# Patient Record
Sex: MALE | Race: WHITE
[De-identification: names, ages, dates, MRNs, and addresses within clinical notes are randomized per-mention and may not be internally consistent; named-entity substitution may affect disease eponyms.]

---
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
## 2018-08-09 ENCOUNTER — HOSPITAL ENCOUNTER (OUTPATIENT)
Dept: HOSPITAL 7 - FB.SDS | Age: 28
Discharge: HOME | End: 2018-08-09
Attending: SURGERY
Payer: MEDICAID

## 2018-08-09 VITALS — DIASTOLIC BLOOD PRESSURE: 82 MMHG | SYSTOLIC BLOOD PRESSURE: 143 MMHG

## 2018-08-09 DIAGNOSIS — Z88.0: ICD-10-CM

## 2018-08-09 DIAGNOSIS — Z79.899: ICD-10-CM

## 2018-08-09 DIAGNOSIS — K29.50: Primary | ICD-10-CM

## 2018-08-09 DIAGNOSIS — K21.0: ICD-10-CM

## 2018-08-09 DIAGNOSIS — F17.210: ICD-10-CM

## 2018-08-09 PROCEDURE — 43239 EGD BIOPSY SINGLE/MULTIPLE: CPT

## 2018-08-09 NOTE — OR
DATE OF OPERATION:  08/09/2018

 

SURGEON:  Stalin Kaiser MD

 

PROCEDURES PERFORMED:  EGD with cold forceps biopsy.

 

PREOPERATIVE DIAGNOSES:

1. Dysphagia.

2. Gastroesophageal reflux disease.

 

POSTOPERATIVE DIAGNOSES:

1. Gastritis.

2. Esophagitis.

 

INDICATIONS FOR PROCEDURE:  This is a 28-year-old white male who is referred

with a history of regurgitation, difficulty swallowing, and reflux.  He was

offered and accepted an EGD.

 

DESCRIPTION OF PROCEDURE:  After an excellent IV sedation was administered, the

bite block was inserted.  The flexible endoscope was passed without difficulty

down the patient's esophagus and into the stomach.  The stomach was insufflated,

and the scope was advanced through the pylorus, to the second portion of the

duodenum, and slowly withdrawn.  The following findings were noted.  Duodenum

unremarkable.  Stomach demonstrated some linear erosions.  Biopsies were taken.

GE junction measured approximately 40 cm.  Distal esophagus demonstrated some

erythema in the distal 2 to 3 cm, which was biopsied.  The remainder of the

esophageal exam was unremarkable.  Stomach was deflated.  The scope was removed.

The patient tolerated the procedure well and was taken to recovery room in a

good condition.

 

Job#: [id number]

DD: 08/09/2018 1019

DT: 08/09/2018 1127 AS/MODL

## 2018-08-09 NOTE — PCM.OPNOTE
- General Post-Op/Procedure Note


Date of Surgery/Procedure: 08/09/18


Operative Procedure(s): egd with bx


Findings: 





gastritis 


esophagitis 


Pre Op Diagnosis: dysphagia.  gerd


Post-Op Diagnosis: gastritis.  esophagitis


Anesthesia Technique: MAC


Primary Surgeon: Stalin Kaiser


Anesthesia Provider: Beatriz Fraser


Pathology: 





stomach and esophagus 


Complications: None


Condition: Good


Free Text/Narrative:: 





see dictation

## 2021-11-07 NOTE — EDM.PDOC
ED HPI GENERAL MEDICAL PROBLEM





- General


Stated Complaint: BACK PAIN


Time Seen by Provider: 11/07/21 09:10


Source of Information: Reports: Patient


History Limitations: Reports: No Limitations





- History of Present Illness


INITIAL COMMENTS - FREE TEXT/NARRATIVE: 





Patient presented to the ED because of low back pain which started 3 days ago. 

He was trying to move a 300 lb object at work and since then he c/o low back 

pain,3/10 -10/10 when it's worse. The pain is worse with movements, he took 

flexeril 10 mg without any relief.


  ** Lower Back


Pain Score (Numeric/FACES): 4





- Related Data


                                    Allergies











Allergy/AdvReac Type Severity Reaction Status Date / Time


 


Penicillins Allergy Unknown Hives Verified 02/24/16 22:47


 


acetaminophen Allergy  Hives Verified 08/09/16 13:30











Home Meds: 


                                    Home Meds





Esomeprazole [NexIUM] 40 mg PO DAILY 08/08/18 [History]


Lisinopril 20 mg PO DAILY 08/08/18 [History]


Propranolol [Inderal] 20 mg PO BID 08/08/18 [History]


Sertraline [Zoloft] 150 mg PO DAILY 08/08/18 [History]


Cyclobenzaprine [Flexeril] 10 mg PO TID PRN #15 tab 11/07/21 [Rx]


Ibuprofen 800 mg PO TID PRN #30 tablet 11/07/21 [Rx]


traMADol [Ultram] 100 mg PO Q8H PRN #15 tab 11/07/21 [Rx]











Past Medical History





- Past Health History


Medical/Surgical History: Denies Medical/Surgical History


HEENT History: Reports: Impaired Vision


Cardiovascular History: Reports: Hypertension


Respiratory History: Reports: None


Gastrointestinal History: Reports: GERD, Hiatal Hernia


Genitourinary History: Reports: None


OB/GYN History: Reports: None


Musculoskeletal History: Reports: Fracture


Other Musculoskeletal History: FX LEFT POINTER FINGER


Neurological History: Reports: None


Psychiatric History: Reports: ADHD, Addiction, Depression


Other Psychiatric History: HX DRUG ABUSE.  STATES TAKING POT SINCE AGE 13 ET 

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


Endocrine/Metabolic History: Reports: Obesity/BMI 30+


Hematologic History: Reports: None


Oncologic (Cancer) History: Reports: None


Dermatologic History: Reports: None





- Infectious Disease History


Infectious Disease History: Reports: None





- Past Surgical History


GI Surgical History: Reports: Appendectomy, Colonoscopy, EGD





Social & Family History





- Family History


Family Medical History: No Pertinent Family History





- Caffeine Use


Caffeine Use: Reports: Soda





ED ROS GENERAL





- Review of Systems


Review Of Systems: See Below


Constitutional: Reports: No Symptoms


HEENT: Reports: No Symptoms


Respiratory: Reports: No Symptoms


Cardiovascular: Reports: Dyspnea on Exertion


Endocrine: Reports: No Symptoms


GI/Abdominal: Reports: No Symptoms


: Reports: No Symptoms


Musculoskeletal: Reports: Back Pain, Muscle Pain, Muscle Stiffness


Skin: Reports: No Symptoms


Neurological: Reports: No Symptoms


Psychiatric: Reports: No Symptoms





ED EXAM,LOWER BACK PAIN/INJURY





- Physical Exam


Exam: See Below


Exam Limited By: No Limitations


General Appearance: Alert, No Apparent Distress


Ears: Normal External Exam, Normal Canal


Nose: Normal Inspection, Normal Mucosa, No Blood


Throat/Mouth: Normal Inspection, Normal Lips, Normal Teeth, Normal Gums, Normal 

Oropharynx, Normal Voice


Head: Atraumatic, Normocephalic


Neck: Normal Inspection, Supple, Non-Tender, Full Range of Motion


Respiratory/Chest: No Respiratory Distress, Lungs Clear, Normal Breath Sounds, 

No Accessory Muscle Use, Chest Non-Tender


Cardiovascular: Normal Peripheral Pulses, Regular Rate, Rhythm, No Edema, No 

Gallop, No JVD, No Murmur, No Rub


Back Exam: Normal Inspection, Full Range of Motion





Course





- Vital Signs


Text/Narrative:: 





Toradol 60 mg IM x1


Tramadol 100 mg PO x1


Flexeril 10 mg PO x1


Last Recorded V/S: 


                                Last Vital Signs











Temp  36.3 C   11/07/21 09:27


 


Pulse  98   11/07/21 09:27


 


Resp  18   11/07/21 09:27


 


BP  136/95 H  11/07/21 09:27


 


Pulse Ox  99   11/07/21 09:27














- Orders/Labs/Meds


Meds: 


Medications














Discontinued Medications














Generic Name Dose Route Start Last Admin





  Trade Name Kamarq  PRN Reason Stop Dose Admin


 


Cyclobenzaprine HCl  10 mg  11/07/21 09:07  11/07/21 09:21





  Cyclobenzaprine 10 Mg Tab  PO  11/07/21 09:08  10 mg





  NOW STA   Administration


 


Ketorolac Tromethamine  60 mg  11/07/21 09:07  11/07/21 09:20





  Ketorolac 30 Mg/Ml Sdv  IM  11/07/21 09:08  60 mg





  NOW STA   Administration


 


Tramadol HCl  100 mg  11/07/21 09:07  11/07/21 09:21





  Tramadol 50 Mg Tab  PO  11/07/21 09:08  100 mg





  NOW STA   Administration














Departure





- Departure


Time of Disposition: 09:45


Disposition: Home, Self-Care 01


Condition: Good


Clinical Impression: 


 Low back pain, Lumbosacral strain








- Discharge Information


Prescriptions: 


Cyclobenzaprine [Flexeril] 10 mg PO TID PRN #15 tab


 PRN Reason: Spasms


Ibuprofen 800 mg PO TID PRN #30 tablet


 PRN Reason: Pain


traMADol [Ultram] 100 mg PO Q8H PRN #15 tab


 PRN Reason: Pain


Instructions:  Acute Back Pain, Adult, Lumbosacral Strain


Referrals: 


Nakul Laguna MD [Primary Care Provider] - 


Forms:  ED Department Discharge


Additional Instructions: 


Please read discharge instructions on low back pain


Apply ice r heat whichever you prefer


Take all the following medications for better pain relief: Tramadol 100 mg with 

Ibuprofen 800 mg and flexeril 10 mg 3 times daily for pain and spasm


Follow up as needed